# Patient Record
Sex: FEMALE
[De-identification: names, ages, dates, MRNs, and addresses within clinical notes are randomized per-mention and may not be internally consistent; named-entity substitution may affect disease eponyms.]

---

## 2017-08-21 ENCOUNTER — TRANSCRIPTION ENCOUNTER (OUTPATIENT)
Age: 47
End: 2017-08-21

## 2018-08-19 ENCOUNTER — TRANSCRIPTION ENCOUNTER (OUTPATIENT)
Age: 48
End: 2018-08-19

## 2019-02-26 ENCOUNTER — TRANSCRIPTION ENCOUNTER (OUTPATIENT)
Age: 49
End: 2019-02-26

## 2019-04-04 ENCOUNTER — TRANSCRIPTION ENCOUNTER (OUTPATIENT)
Age: 49
End: 2019-04-04

## 2019-11-15 ENCOUNTER — TRANSCRIPTION ENCOUNTER (OUTPATIENT)
Age: 49
End: 2019-11-15

## 2019-12-05 ENCOUNTER — TRANSCRIPTION ENCOUNTER (OUTPATIENT)
Age: 49
End: 2019-12-05

## 2019-12-10 PROBLEM — Z00.00 ENCOUNTER FOR PREVENTIVE HEALTH EXAMINATION: Status: ACTIVE | Noted: 2019-12-10

## 2020-01-08 ENCOUNTER — APPOINTMENT (OUTPATIENT)
Dept: NEUROLOGY | Facility: CLINIC | Age: 50
End: 2020-01-08
Payer: COMMERCIAL

## 2020-01-08 VITALS
HEIGHT: 69.5 IN | HEART RATE: 80 BPM | SYSTOLIC BLOOD PRESSURE: 119 MMHG | OXYGEN SATURATION: 97 % | TEMPERATURE: 98.3 F | DIASTOLIC BLOOD PRESSURE: 80 MMHG | WEIGHT: 148 LBS | BODY MASS INDEX: 21.43 KG/M2

## 2020-01-08 DIAGNOSIS — Z78.9 OTHER SPECIFIED HEALTH STATUS: ICD-10-CM

## 2020-01-08 DIAGNOSIS — G51.0 BELL'S PALSY: ICD-10-CM

## 2020-01-08 DIAGNOSIS — Z82.61 FAMILY HISTORY OF ARTHRITIS: ICD-10-CM

## 2020-01-08 PROCEDURE — 99204 OFFICE O/P NEW MOD 45 MIN: CPT

## 2020-01-08 RX ORDER — ESTRADIOL AND LEVONORGESTREL .045; .015 MG/D; MG/D
PATCH TRANSDERMAL
Refills: 0 | Status: ACTIVE | COMMUNITY

## 2020-01-08 NOTE — ASSESSMENT
[FreeTextEntry1] : 1) 7th nerve palsy, resolving.  Given 7th nerve enhancement on [place] MRI and resolving symptoms, agree this is most likely Bell's palsy, though difficult to distinguish central vs peripheral on exam at this time as forehead raise is symmetric, but symptoms are subtle overall.  Most likely viral but will rule out Lyme.\par -Lyme Western blot\par \par 2) Trouble focusing.  Unrelated, but patient reports that it has been bothering her for many years and she would like to be evaluated.\par -Neuropsych testing\par -RTC PRN

## 2020-01-08 NOTE — HISTORY OF PRESENT ILLNESS
[FreeTextEntry1] : 49-year-old woman who presents following recent ED visit for Bell's palsy.\par \par Saturday after Thanksgiving was driving back to the city from [place], felt lightheaded and weak.  Symptoms continued over the next few days.  On Wednesday she noted that she could not hear well, could not close her lips around a straw, lower face was numb, and face was asymmetric.  \par \par She presented to urgent care and was sent to [facility].  On exam noted to have L facial droop (unclear whether central or peripheral from notes).  CT head negative.  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  She was diagnosed with Bell's palsy and discharged with prednisone 60 mg x 7 days.\par \par Since then her facial weakness has gradually improved -- now able to close eye, though mouth still looks slightly asymmetric.  Hearing is back to normal.  Feels like her vision may be impaired but thinks this is due to eye dryness.\par \par Has never had anything like this before.  No other history of numbness, weakness, tingling or other neurologic symptoms.  Does report that she has trouble focusing, is a procrastinator.

## 2020-01-08 NOTE — PHYSICAL EXAM
[FreeTextEntry1] : Physical Exam\par Constitutional: no apparent distress\par Psychiatric: normal affect, euthyhmic, alert and oriented x 3\par HEENT: moist mucous membranes, oropharynx clear\par Neck: supple, no lymphadenopathy\par Respiratory: clear to auscultation bilaterally, no crackles or wheezing\par Cardiovascular: regular rate and rhythm, normal S1/S2, no murmurs, no edema\par GI: soft, non-tender, no distended, bowel sounds present; no hepatosplenomegaly on palpation\par Musculoskeletal: extremities warm, well-perfused, normal range of motion\par Skin: no rashes, bruises, or lesions\par \par Neurologic Exam:\par Mental Status: awake and alert, oriented to time, place, and person, attention intact, speech fluent and prosodic with no paraphasic errors, repetition intact, follows simple and complex commands, normal fund of knowledge\par Cranial Nerves: I: deferred; II: pupils equal round and reactive, visual fields full to confrontation; III, IV, VI: extraocular movements full with no nystagmus; V: facial sensation intact and symmetric; VII: forehead symmetric, left nasolabial fold flattening; VIII: hearing intact to finger rub; IX/X: palate elevates symmetrically, no dysarthria; XI: shoulder shrug symmetric; XII: tongue protrudes midline\par Motor: normal bulk and tone, no orbiting or pronator drift, power 5/5 to confrontation throughout including shoulder abduction, elbow flexion and extension, wrist flexion and extension, hip flexion, knee flexion and extension, no abnormal movements\par Sensation: intact to light touch in distal upper and lower extremities bilaterally\par Coordination: finger-nose-finger intact bilaterally\par Reflexes: 2+ biceps, triceps, brachioradialis, patella\par Gait: narrow base, normal stance and stride, normal arm swing

## 2020-01-21 DIAGNOSIS — R41.840 ATTENTION AND CONCENTRATION DEFICIT: ICD-10-CM

## 2020-01-21 LAB
B BURGDOR AB SER-IMP: NEGATIVE
B BURGDOR IGM PATRN SER IB-IMP: NEGATIVE
B BURGDOR18KD IGG SER QL IB: NORMAL
B BURGDOR23KD IGG SER QL IB: NORMAL
B BURGDOR23KD IGM SER QL IB: NORMAL
B BURGDOR28KD IGG SER QL IB: NORMAL
B BURGDOR30KD IGG SER QL IB: NORMAL
B BURGDOR31KD IGG SER QL IB: NORMAL
B BURGDOR39KD IGG SER QL IB: NORMAL
B BURGDOR39KD IGM SER QL IB: NORMAL
B BURGDOR41KD IGG SER QL IB: NORMAL
B BURGDOR41KD IGM SER QL IB: NORMAL
B BURGDOR45KD IGG SER QL IB: NORMAL
B BURGDOR58KD IGG SER QL IB: NORMAL
B BURGDOR66KD IGG SER QL IB: NORMAL
B BURGDOR93KD IGG SER QL IB: NORMAL

## 2020-07-17 ENCOUNTER — TRANSCRIPTION ENCOUNTER (OUTPATIENT)
Age: 50
End: 2020-07-17

## 2021-06-21 ENCOUNTER — TRANSCRIPTION ENCOUNTER (OUTPATIENT)
Age: 51
End: 2021-06-21

## 2021-08-20 ENCOUNTER — TRANSCRIPTION ENCOUNTER (OUTPATIENT)
Age: 51
End: 2021-08-20

## 2021-09-10 ENCOUNTER — TRANSCRIPTION ENCOUNTER (OUTPATIENT)
Age: 51
End: 2021-09-10

## 2021-11-07 ENCOUNTER — TRANSCRIPTION ENCOUNTER (OUTPATIENT)
Age: 51
End: 2021-11-07

## 2021-12-22 ENCOUNTER — TRANSCRIPTION ENCOUNTER (OUTPATIENT)
Age: 51
End: 2021-12-22

## 2022-03-29 ENCOUNTER — TRANSCRIPTION ENCOUNTER (OUTPATIENT)
Age: 52
End: 2022-03-29

## 2023-05-01 ENCOUNTER — OFFICE (OUTPATIENT)
Dept: URBAN - METROPOLITAN AREA CLINIC 28 | Facility: CLINIC | Age: 53
Setting detail: OPHTHALMOLOGY
End: 2023-05-01
Payer: COMMERCIAL

## 2023-05-01 DIAGNOSIS — H25.13: ICD-10-CM

## 2023-05-01 DIAGNOSIS — H40.013: ICD-10-CM

## 2023-05-01 DIAGNOSIS — H52.4: ICD-10-CM

## 2023-05-01 PROCEDURE — 92133 CPTRZD OPH DX IMG PST SGM ON: CPT | Performed by: OPHTHALMOLOGY

## 2023-05-01 PROCEDURE — 92015 DETERMINE REFRACTIVE STATE: CPT | Performed by: OPHTHALMOLOGY

## 2023-05-01 PROCEDURE — 92014 COMPRE OPH EXAM EST PT 1/>: CPT | Performed by: OPHTHALMOLOGY

## 2023-05-01 ASSESSMENT — REFRACTION_CURRENTRX
OD_VPRISM_DIRECTION: PROGS
OS_CYLINDER: -1.00
OS_ADD: +2.00
OD_SPHERE: PLANO
OS_CYLINDER: 0.00
OD_AXIS: 70
OD_CYLINDER: -0.50
OD_ADD: +2.00
OS_OVR_VA: 20/
OS_SPHERE: PLANO
OD_CYLINDER: 0.00
OS_ADD: +2.25
OD_OVR_VA: 20/
OD_AXIS: 180
OD_OVR_VA: 20/
OD_ADD: +2.25
OS_OVR_VA: 20/
OS_SPHERE: PLANO
OS_AXIS: 180
OS_VPRISM_DIRECTION: PROGS
OD_SPHERE: PLANO
OS_AXIS: 104

## 2023-05-01 ASSESSMENT — REFRACTION_MANIFEST
OS_ADD: +2.50
OD_AXIS: 70
OD_SPHERE: +1.25
OD_ADD: +2.50
OD_VA2: 20/20(J1+)
OS_AXIS: 104
OD_CYLINDER: -0.50
OD_AXIS: 090
OS_CYLINDER: -1.00
OD_VA1: 20/25+2
OS_SPHERE: +1.25
OS_SPHERE: PLANO
OS_SPHERE: PL
OD_SPHERE: PL
OS_ADD: +2.25
OD_ADD: +2.25
OS_VA2: 20/20(J1+)
OD_SPHERE: PLANO
OS_AXIS: 100
OD_ADD: +3.00
OS_ADD: +3.00
OD_CYLINDER: -1.00
OS_VA1: 20/20-2
OS_CYLINDER: -1.00

## 2023-05-01 ASSESSMENT — REFRACTION_AUTOREFRACTION
OD_CYLINDER: -1.25
OD_SPHERE: +1.50
OD_AXIS: 091
OS_CYLINDER: -1.25
OS_SPHERE: +1.50
OS_AXIS: 099

## 2023-05-01 ASSESSMENT — AXIALLENGTH_DERIVED
OS_AL: 24.3783
OS_AL: 24.4304
OD_AL: 24.2836
OD_AL: 24.2321

## 2023-05-01 ASSESSMENT — KERATOMETRY
OS_AXISANGLE_DEGREES: 009
OD_AXISANGLE_DEGREES: 003
OD_K2POWER_DIOPTERS: 41.25
OS_K1POWER_DIOPTERS: 40.00
OD_K1POWER_DIOPTERS: 40.50
OS_K2POWER_DIOPTERS: 41.00

## 2023-05-01 ASSESSMENT — SUPERFICIAL PUNCTATE KERATITIS (SPK)
OD_SPK: T
OS_SPK: T

## 2023-05-01 ASSESSMENT — SPHEQUIV_DERIVED
OD_SPHEQUIV: 0.875
OD_SPHEQUIV: 0.75
OS_SPHEQUIV: 0.875
OS_SPHEQUIV: 0.75

## 2023-05-01 ASSESSMENT — PACHYMETRY
OD_CT_UM: 551
OS_CT_UM: 549
OD_CT_CORRECTION: -1
OS_CT_CORRECTION: 0

## 2023-05-01 ASSESSMENT — VISUAL ACUITY
OD_BCVA: 20/25
OS_BCVA: 20/25

## 2023-05-01 ASSESSMENT — TONOMETRY
OS_IOP_MMHG: 17
OD_IOP_MMHG: 18

## 2023-05-01 ASSESSMENT — CONFRONTATIONAL VISUAL FIELD TEST (CVF)
OS_FINDINGS: FULL
OD_FINDINGS: FULL

## 2023-06-03 ENCOUNTER — NON-APPOINTMENT (OUTPATIENT)
Age: 53
End: 2023-06-03

## 2023-06-05 ENCOUNTER — OFFICE (OUTPATIENT)
Dept: URBAN - METROPOLITAN AREA CLINIC 28 | Facility: CLINIC | Age: 53
Setting detail: OPHTHALMOLOGY
End: 2023-06-05
Payer: COMMERCIAL

## 2023-06-05 DIAGNOSIS — H25.13: ICD-10-CM

## 2023-06-05 DIAGNOSIS — H40.013: ICD-10-CM

## 2023-06-05 PROCEDURE — 92083 EXTENDED VISUAL FIELD XM: CPT | Performed by: OPHTHALMOLOGY

## 2023-06-05 PROCEDURE — 92012 INTRM OPH EXAM EST PATIENT: CPT | Performed by: OPHTHALMOLOGY

## 2023-06-05 ASSESSMENT — REFRACTION_CURRENTRX
OS_AXIS: 104
OS_SPHERE: PLANO
OD_SPHERE: PLANO
OS_SPHERE: PLANO
OD_AXIS: 70
OD_OVR_VA: 20/
OD_ADD: +2.25
OD_SPHERE: PLANO
OS_CYLINDER: -1.00
OD_CYLINDER: 0.00
OS_OVR_VA: 20/
OD_AXIS: 180
OS_OVR_VA: 20/
OD_OVR_VA: 20/
OD_CYLINDER: -0.50
OS_ADD: +2.00
OD_ADD: +2.00
OS_VPRISM_DIRECTION: PROGS
OD_VPRISM_DIRECTION: PROGS
OS_CYLINDER: 0.00
OS_AXIS: 180
OS_ADD: +2.25

## 2023-06-05 ASSESSMENT — REFRACTION_MANIFEST
OS_CYLINDER: -1.00
OD_SPHERE: +1.25
OD_SPHERE: PL
OD_AXIS: 090
OD_AXIS: 70
OD_ADD: +3.00
OS_ADD: +2.25
OD_CYLINDER: -1.00
OS_VA2: 20/20(J1+)
OD_CYLINDER: -0.50
OD_VA2: 20/20(J1+)
OS_AXIS: 100
OS_ADD: +2.50
OS_VA1: 20/20-2
OS_SPHERE: PLANO
OD_ADD: +2.50
OD_VA1: 20/25+2
OD_ADD: +2.25
OS_CYLINDER: -1.00
OS_SPHERE: +1.25
OS_SPHERE: PL
OS_AXIS: 104
OS_ADD: +3.00
OD_SPHERE: PLANO

## 2023-06-05 ASSESSMENT — TONOMETRY
OD_IOP_MMHG: 17
OS_IOP_MMHG: 16

## 2023-06-05 ASSESSMENT — SUPERFICIAL PUNCTATE KERATITIS (SPK)
OS_SPK: T
OD_SPK: T

## 2023-06-05 ASSESSMENT — KERATOMETRY
OD_K2POWER_DIOPTERS: 41.25
OS_K2POWER_DIOPTERS: 41.00
OS_AXISANGLE_DEGREES: 012
OD_AXISANGLE_DEGREES: 004
OD_K1POWER_DIOPTERS: 40.25
OS_K1POWER_DIOPTERS: 40.25

## 2023-06-05 ASSESSMENT — SPHEQUIV_DERIVED
OD_SPHEQUIV: 0.75
OS_SPHEQUIV: 0.875
OD_SPHEQUIV: 0.625
OS_SPHEQUIV: 0.75

## 2023-06-05 ASSESSMENT — PACHYMETRY
OD_CT_CORRECTION: -1
OS_CT_CORRECTION: 0
OD_CT_UM: 551
OS_CT_UM: 549

## 2023-06-05 ASSESSMENT — AXIALLENGTH_DERIVED
OD_AL: 24.3323
OD_AL: 24.3842
OS_AL: 24.3294
OS_AL: 24.3813

## 2023-06-05 ASSESSMENT — REFRACTION_AUTOREFRACTION
OD_CYLINDER: -1.25
OD_AXIS: 090
OS_CYLINDER: -1.25
OS_AXIS: 101
OS_SPHERE: +1.50
OD_SPHERE: +1.25

## 2023-06-05 ASSESSMENT — VISUAL ACUITY
OD_BCVA: 20/25
OS_BCVA: 20/25

## 2023-06-05 ASSESSMENT — CONFRONTATIONAL VISUAL FIELD TEST (CVF)
OS_FINDINGS: FULL
OD_FINDINGS: FULL

## 2023-12-27 ENCOUNTER — NON-APPOINTMENT (OUTPATIENT)
Age: 53
End: 2023-12-27

## 2024-03-23 ENCOUNTER — NON-APPOINTMENT (OUTPATIENT)
Age: 54
End: 2024-03-23

## 2024-08-28 ENCOUNTER — OFFICE (OUTPATIENT)
Dept: URBAN - METROPOLITAN AREA CLINIC 97 | Facility: CLINIC | Age: 54
Setting detail: OPHTHALMOLOGY
End: 2024-08-28
Payer: COMMERCIAL

## 2024-08-28 ENCOUNTER — RX ONLY (RX ONLY)
Age: 54
End: 2024-08-28

## 2024-08-28 DIAGNOSIS — H16.223: ICD-10-CM

## 2024-08-28 DIAGNOSIS — H52.4: ICD-10-CM

## 2024-08-28 DIAGNOSIS — H25.13: ICD-10-CM

## 2024-08-28 DIAGNOSIS — H40.013: ICD-10-CM

## 2024-08-28 PROBLEM — H10.45 ALLERGIC CONJUNCTIVITIS: Status: ACTIVE | Noted: 2024-08-28

## 2024-08-28 PROCEDURE — 92133 CPTRZD OPH DX IMG PST SGM ON: CPT | Performed by: OPTOMETRIST

## 2024-08-28 PROCEDURE — 92014 COMPRE OPH EXAM EST PT 1/>: CPT | Performed by: OPTOMETRIST

## 2024-08-28 PROCEDURE — 92015 DETERMINE REFRACTIVE STATE: CPT | Performed by: OPTOMETRIST

## 2024-08-28 ASSESSMENT — CONFRONTATIONAL VISUAL FIELD TEST (CVF)
OD_FINDINGS: FULL
OS_FINDINGS: FULL

## 2024-11-22 ENCOUNTER — APPOINTMENT (OUTPATIENT)
Dept: NEUROLOGY | Facility: CLINIC | Age: 54
End: 2024-11-22

## 2024-11-22 VITALS
HEART RATE: 74 BPM | HEIGHT: 69 IN | OXYGEN SATURATION: 98 % | SYSTOLIC BLOOD PRESSURE: 121 MMHG | DIASTOLIC BLOOD PRESSURE: 80 MMHG | BODY MASS INDEX: 21.95 KG/M2 | TEMPERATURE: 98.4 F | WEIGHT: 148.2 LBS

## 2024-11-22 DIAGNOSIS — R41.840 ATTENTION AND CONCENTRATION DEFICIT: ICD-10-CM

## 2024-11-22 PROCEDURE — 99204 OFFICE O/P NEW MOD 45 MIN: CPT

## 2024-11-22 RX ORDER — ATOMOXETINE 18 MG/1
18 CAPSULE ORAL
Qty: 60 | Refills: 1 | Status: ACTIVE | COMMUNITY
Start: 2024-11-22 | End: 1900-01-01

## 2025-01-20 ENCOUNTER — OFFICE (OUTPATIENT)
Dept: URBAN - METROPOLITAN AREA CLINIC 97 | Facility: CLINIC | Age: 55
Setting detail: OPHTHALMOLOGY
End: 2025-01-20
Payer: COMMERCIAL

## 2025-01-20 DIAGNOSIS — H25.13: ICD-10-CM

## 2025-01-20 DIAGNOSIS — H16.223: ICD-10-CM

## 2025-01-20 DIAGNOSIS — H40.013: ICD-10-CM

## 2025-01-20 PROCEDURE — 92014 COMPRE OPH EXAM EST PT 1/>: CPT | Performed by: OPTOMETRIST

## 2025-01-20 PROCEDURE — 92083 EXTENDED VISUAL FIELD XM: CPT | Performed by: OPTOMETRIST

## 2025-01-20 ASSESSMENT — PACHYMETRY
OD_CT_CORRECTION: -1
OD_CT_UM: 551
OS_CT_UM: 549
OS_CT_CORRECTION: 0

## 2025-01-20 ASSESSMENT — TONOMETRY
OS_IOP_MMHG: 18
OD_IOP_MMHG: 19

## 2025-01-20 ASSESSMENT — SUPERFICIAL PUNCTATE KERATITIS (SPK)
OD_SPK: T
OS_SPK: T

## 2025-01-20 ASSESSMENT — CONFRONTATIONAL VISUAL FIELD TEST (CVF)
OD_FINDINGS: FULL
OS_FINDINGS: FULL

## 2025-01-21 PROBLEM — Z46.0 ENCOUNTER FOR FITTING AND ADJUSTMENT OF SPECTACLES AND CONTACT LENSES ; BOTH EYES: Status: ACTIVE | Noted: 2025-01-20

## 2025-01-21 ASSESSMENT — REFRACTION_CURRENTRX
OS_ADD: +2.25
OD_OVR_VA: 20/
OS_VPRISM_DIRECTION: PROGS
OS_OVR_VA: 20/
OD_AXIS: 085
OS_ADD: +2.25
OD_VPRISM_DIRECTION: PROGS
OD_ADD: +2.25
OD_AXIS: 180
OD_ADD: +2.25
OS_SPHERE: +1.75
OD_SPHERE: +1.50
OS_OVR_VA: 20/
OS_AXIS: 095
OS_SPHERE: PLANO
OD_OVR_VA: 20/
OD_VPRISM_DIRECTION: PROGS
OS_CYLINDER: -1.25
OS_AXIS: 180
OD_CYLINDER: -1.25
OS_CYLINDER: 0.00
OS_VPRISM_DIRECTION: PROGS
OD_SPHERE: PLANO
OD_CYLINDER: 0.00

## 2025-01-21 ASSESSMENT — REFRACTION_MANIFEST
OD_ADD: +2.25
OS_AXIS: 005
OS_SPHERE: PL
OD_CYLINDER: -0.50
OD_VA1: 20/20
OS_ADD: +2.25
OD_SPHERE: +0.25
OS_AXIS: 104
OD_VA2: 20/20(J1+)
OS_ADD: +2.25
OD_VA1: 20/20
OS_CYLINDER: -1.00
OS_CYLINDER: +0.75
OD_ADD: +2.25
OD_SPHERE: PL
OS_VA1: 20/20
OS_VA1: 20/20
OS_VA2: 20/20(J1+)
OD_VA2: 20/20(J1+)
OD_CYLINDER: +0.75
OS_VA2: 20/20(J1+)
OD_AXIS: 175
OS_ADD: +2.50
OD_AXIS: 175
OD_CYLINDER: +1.25
OS_SPHERE: +0.25
OS_CYLINDER: +1.25
OD_SPHERE: +0.25
OS_SPHERE: +0.50
OD_ADD: +2.50
OS_AXIS: 005
OD_AXIS: 70

## 2025-01-21 ASSESSMENT — KERATOMETRY
OS_AXISANGLE_DEGREES: 012
OD_K2POWER_DIOPTERS: 41.25
OD_AXISANGLE_DEGREES: 001
OS_K1POWER_DIOPTERS: 40.00
OD_K1POWER_DIOPTERS: 40.50
OS_K2POWER_DIOPTERS: 41.00
METHOD_AUTO_MANUAL: AUTO

## 2025-01-21 ASSESSMENT — VISUAL ACUITY
OS_BCVA: 20/25
OD_BCVA: 20/25-

## 2025-01-21 ASSESSMENT — REFRACTION_AUTOREFRACTION
OD_CYLINDER: +1.50
OS_AXIS: 008
OS_SPHERE: +0.50
OD_AXIS: 176
OD_SPHERE: +0.25
OS_CYLINDER: +1.25

## 2025-02-06 ENCOUNTER — NON-APPOINTMENT (OUTPATIENT)
Age: 55
End: 2025-02-06

## 2025-02-06 ENCOUNTER — APPOINTMENT (OUTPATIENT)
Dept: NEUROLOGY | Facility: CLINIC | Age: 55
End: 2025-02-06
Payer: COMMERCIAL

## 2025-02-06 VITALS
OXYGEN SATURATION: 100 % | SYSTOLIC BLOOD PRESSURE: 119 MMHG | TEMPERATURE: 98.5 F | HEIGHT: 69 IN | DIASTOLIC BLOOD PRESSURE: 78 MMHG | HEART RATE: 93 BPM | BODY MASS INDEX: 22.36 KG/M2 | WEIGHT: 151 LBS

## 2025-02-06 DIAGNOSIS — R41.840 ATTENTION AND CONCENTRATION DEFICIT: ICD-10-CM

## 2025-02-06 PROCEDURE — 99214 OFFICE O/P EST MOD 30 MIN: CPT

## 2025-02-06 RX ORDER — METHYLPHENIDATE HYDROCHLORIDE 5 MG/1
5 TABLET ORAL DAILY
Qty: 30 | Refills: 0 | Status: ACTIVE | COMMUNITY
Start: 2025-02-06 | End: 1900-01-01

## 2025-02-10 ENCOUNTER — NON-APPOINTMENT (OUTPATIENT)
Age: 55
End: 2025-02-10

## 2025-02-10 ASSESSMENT — REFRACTION_CURRENTRX
OD_ADD: +2.25
OS_AXIS: 180
OD_SPHERE: PLANO
OS_VPRISM_DIRECTION: PROGS
OD_ADD: +2.25
OS_SPHERE: +1.75
OD_CYLINDER: -1.25
OS_VPRISM_DIRECTION: PROGS
OD_OVR_VA: 20/
OS_OVR_VA: 20/
OD_AXIS: 180
OS_ADD: +2.25
OS_CYLINDER: 0.00
OS_AXIS: 095
OS_ADD: +2.25
OD_OVR_VA: 20/
OD_VPRISM_DIRECTION: PROGS
OS_CYLINDER: -1.25
OS_SPHERE: PLANO
OS_OVR_VA: 20/
OD_SPHERE: +1.50
OD_VPRISM_DIRECTION: PROGS
OD_CYLINDER: 0.00
OD_AXIS: 085

## 2025-02-10 ASSESSMENT — REFRACTION_MANIFEST
OS_ADD: +2.25
OD_ADD: +2.50
OS_VA1: 20/20
OD_AXIS: 175
OD_AXIS: 175
OS_VA2: 20/20(J1+)
OS_SPHERE: +0.25
OD_ADD: +2.25
OS_SPHERE: PL
OS_AXIS: 005
OD_VA1: 20/20
OD_CYLINDER: +0.75
OD_VA2: 20/20(J1+)
OD_VA2: 20/20(J1+)
OS_AXIS: 104
OD_CYLINDER: -0.50
OS_SPHERE: +0.50
OD_SPHERE: +0.25
OD_SPHERE: PL
OD_AXIS: 70
OS_AXIS: 005
OD_VA1: 20/20
OS_CYLINDER: +0.75
OD_CYLINDER: +1.25
OD_SPHERE: +0.25
OD_ADD: +2.25
OS_VA1: 20/20
OS_VA2: 20/20(J1+)
OS_ADD: +2.50
OS_ADD: +2.25
OS_CYLINDER: +1.25
OS_CYLINDER: -1.00

## 2025-02-10 ASSESSMENT — KERATOMETRY
OD_K2POWER_DIOPTERS: 41.25
OD_AXISANGLE_DEGREES: 001
OD_K1POWER_DIOPTERS: 40.50
OS_AXISANGLE_DEGREES: 012
OS_K2POWER_DIOPTERS: 41.00
OS_K1POWER_DIOPTERS: 40.00
METHOD_AUTO_MANUAL: AUTO

## 2025-02-12 ENCOUNTER — APPOINTMENT (OUTPATIENT)
Dept: HEART AND VASCULAR | Facility: CLINIC | Age: 55
End: 2025-02-12
Payer: COMMERCIAL

## 2025-02-12 VITALS
OXYGEN SATURATION: 99 % | TEMPERATURE: 97.5 F | WEIGHT: 150 LBS | SYSTOLIC BLOOD PRESSURE: 122 MMHG | HEART RATE: 75 BPM | HEIGHT: 69 IN | BODY MASS INDEX: 22.22 KG/M2 | DIASTOLIC BLOOD PRESSURE: 78 MMHG

## 2025-02-12 DIAGNOSIS — R00.2 PALPITATIONS: ICD-10-CM

## 2025-02-12 PROCEDURE — G2211 COMPLEX E/M VISIT ADD ON: CPT | Mod: NC

## 2025-02-12 PROCEDURE — 99204 OFFICE O/P NEW MOD 45 MIN: CPT

## 2025-02-12 RX ORDER — PROPRANOLOL HYDROCHLORIDE 10 MG/1
10 TABLET ORAL
Qty: 60 | Refills: 3 | Status: ACTIVE | COMMUNITY
Start: 2025-02-12 | End: 1900-01-01

## 2025-02-13 ENCOUNTER — APPOINTMENT (OUTPATIENT)
Dept: HEART AND VASCULAR | Facility: CLINIC | Age: 55
End: 2025-02-13
Payer: COMMERCIAL

## 2025-02-13 PROCEDURE — 93306 TTE W/DOPPLER COMPLETE: CPT

## 2025-05-12 ENCOUNTER — OFFICE (OUTPATIENT)
Dept: URBAN - METROPOLITAN AREA CLINIC 97 | Facility: CLINIC | Age: 55
Setting detail: OPHTHALMOLOGY
End: 2025-05-12
Payer: COMMERCIAL

## 2025-05-12 DIAGNOSIS — Z46.0: ICD-10-CM

## 2025-05-12 PROCEDURE — CLNEW CL LENS FIT FIRST TIME WEARER FITTING FEE ONLY: Performed by: OPTOMETRIST

## 2025-05-12 ASSESSMENT — REFRACTION_MANIFEST
OS_SPHERE: +0.25
OD_ADD: +2.25
OS_SPHERE: PL
OD_ADD: +2.50
OD_VA1: 20/20
OD_CYLINDER: -0.50
OD_SPHERE: +0.25
OS_ADD: +2.50
OD_SPHERE: PL
OS_CYLINDER: +1.25
OD_AXIS: 70
OS_VA2: 20/20(J1+)
OD_CYLINDER: +1.25
OS_ADD: +2.25
OD_AXIS: 175
OD_VA2: 20/20(J1+)
OD_VA2: 20/20(J1+)
OS_CYLINDER: -1.00
OS_AXIS: 104
OS_VA1: 20/20
OD_CYLINDER: +0.75
OS_AXIS: 005
OD_ADD: +2.25
OS_VA2: 20/20(J1+)
OS_AXIS: 005
OD_AXIS: 175
OS_VA1: 20/20
OS_ADD: +2.25
OD_SPHERE: +0.25
OS_SPHERE: +0.50
OS_CYLINDER: +0.75
OD_VA1: 20/20

## 2025-05-12 ASSESSMENT — REFRACTION_CURRENTRX
OD_VPRISM_DIRECTION: PROGS
OD_OVR_VA: 20/
OS_SPHERE: +1.75
OD_AXIS: 180
OS_VPRISM_DIRECTION: PROGS
OS_AXIS: 095
OS_ADD: +2.25
OD_AXIS: 085
OD_CYLINDER: -1.25
OS_SPHERE: PLANO
OS_OVR_VA: 20/
OD_ADD: +2.25
OS_CYLINDER: 0.00
OD_ADD: +2.25
OS_ADD: +2.25
OD_CYLINDER: 0.00
OS_CYLINDER: -1.25
OD_OVR_VA: 20/
OD_SPHERE: PLANO
OS_VPRISM_DIRECTION: PROGS
OS_AXIS: 180
OD_VPRISM_DIRECTION: PROGS
OD_SPHERE: +1.50
OS_OVR_VA: 20/

## 2025-05-12 ASSESSMENT — REFRACTION_AUTOREFRACTION
OS_AXIS: 009
OS_CYLINDER: +1.00
OD_AXIS: 176
OS_SPHERE: +0.50
OD_SPHERE: +0.25
OD_CYLINDER: +1.25

## 2025-05-12 ASSESSMENT — VISUAL ACUITY
OS_BCVA: 20/25
OD_BCVA: 20/25

## 2025-05-12 ASSESSMENT — KERATOMETRY
OS_K1POWER_DIOPTERS: 40.00
OD_K2POWER_DIOPTERS: 41.00
METHOD_AUTO_MANUAL: AUTO
OD_AXISANGLE_DEGREES: 003
OS_AXISANGLE_DEGREES: 011
OD_K1POWER_DIOPTERS: 40.25
OS_K2POWER_DIOPTERS: 40.75

## 2025-05-12 ASSESSMENT — CONFRONTATIONAL VISUAL FIELD TEST (CVF)
OD_FINDINGS: FULL
OS_FINDINGS: FULL

## 2025-05-14 ENCOUNTER — APPOINTMENT (OUTPATIENT)
Dept: HEART AND VASCULAR | Facility: CLINIC | Age: 55
End: 2025-05-14
Payer: COMMERCIAL

## 2025-05-14 VITALS
WEIGHT: 155 LBS | OXYGEN SATURATION: 99 % | BODY MASS INDEX: 22.96 KG/M2 | HEART RATE: 87 BPM | SYSTOLIC BLOOD PRESSURE: 106 MMHG | DIASTOLIC BLOOD PRESSURE: 66 MMHG | HEIGHT: 69 IN

## 2025-05-14 DIAGNOSIS — I49.3 VENTRICULAR PREMATURE DEPOLARIZATION: ICD-10-CM

## 2025-05-14 PROCEDURE — 99214 OFFICE O/P EST MOD 30 MIN: CPT

## 2025-05-14 PROCEDURE — G2211 COMPLEX E/M VISIT ADD ON: CPT | Mod: NC

## 2025-05-16 LAB
ANION GAP SERPL CALC-SCNC: 13 MMOL/L
BUN SERPL-MCNC: 16 MG/DL
CALCIUM SERPL-MCNC: 9.5 MG/DL
CHLORIDE SERPL-SCNC: 103 MMOL/L
CO2 SERPL-SCNC: 24 MMOL/L
CREAT SERPL-MCNC: 0.71 MG/DL
EGFRCR SERPLBLD CKD-EPI 2021: 99 ML/MIN/1.73M2
GLUCOSE SERPL-MCNC: 88 MG/DL
POTASSIUM SERPL-SCNC: 4.1 MMOL/L
SODIUM SERPL-SCNC: 140 MMOL/L

## 2025-05-26 ENCOUNTER — TRANSCRIPTION ENCOUNTER (OUTPATIENT)
Age: 55
End: 2025-05-26

## 2025-06-07 ENCOUNTER — TRANSCRIPTION ENCOUNTER (OUTPATIENT)
Age: 55
End: 2025-06-07

## 2025-06-17 ENCOUNTER — TRANSCRIPTION ENCOUNTER (OUTPATIENT)
Age: 55
End: 2025-06-17

## 2025-07-01 ENCOUNTER — OFFICE (OUTPATIENT)
Dept: URBAN - METROPOLITAN AREA CLINIC 97 | Facility: CLINIC | Age: 55
Setting detail: OPHTHALMOLOGY
End: 2025-07-01
Payer: COMMERCIAL

## 2025-07-01 DIAGNOSIS — Z46.0: ICD-10-CM

## 2025-07-01 PROCEDURE — N/C NO CHARGE: Performed by: OPTOMETRIST

## 2025-07-01 ASSESSMENT — REFRACTION_CURRENTRX
OD_ADD: +2.25
OS_SPHERE: PLANO
OS_VPRISM_DIRECTION: PROGS
OD_CYLINDER: 0.00
OD_AXIS: 180
OS_CYLINDER: 0.00
OS_AXIS: 095
OS_AXIS: 180
OS_OVR_VA: 20/
OD_VPRISM_DIRECTION: PROGS
OS_VPRISM_DIRECTION: PROGS
OD_SPHERE: PLANO
OD_OVR_VA: 20/
OD_OVR_VA: 20/
OD_AXIS: 085
OS_ADD: +2.25
OD_ADD: +2.25
OS_ADD: +2.25
OS_CYLINDER: -1.25
OD_VPRISM_DIRECTION: PROGS
OD_CYLINDER: -1.25
OS_OVR_VA: 20/
OD_SPHERE: +1.50
OS_SPHERE: +1.75

## 2025-07-01 ASSESSMENT — REFRACTION_MANIFEST
OS_SPHERE: +0.25
OS_ADD: +2.25
OS_SPHERE: +0.50
OD_CYLINDER: -0.50
OD_VA2: 20/20(J1+)
OD_VA1: 20/20
OD_AXIS: 175
OD_AXIS: 175
OS_ADD: +2.50
OD_ADD: +2.25
OS_VA1: 20/20
OS_VA1: 20/20
OS_AXIS: 005
OS_SPHERE: PL
OS_VA2: 20/20(J1+)
OS_VA2: 20/20(J1+)
OD_CYLINDER: +0.75
OS_AXIS: 005
OD_ADD: +2.50
OD_SPHERE: PL
OD_CYLINDER: +1.25
OD_SPHERE: +0.25
OD_AXIS: 70
OD_ADD: +2.25
OS_CYLINDER: +0.75
OS_ADD: +2.25
OS_CYLINDER: +1.25
OD_VA1: 20/20
OD_VA2: 20/20(J1+)
OD_SPHERE: +0.25
OS_AXIS: 104
OS_CYLINDER: -1.00

## 2025-07-01 ASSESSMENT — KERATOMETRY
OS_AXISANGLE_DEGREES: 011
OD_K2POWER_DIOPTERS: 41.00
OD_AXISANGLE_DEGREES: 003
OS_K1POWER_DIOPTERS: 40.00
OD_K1POWER_DIOPTERS: 40.25
METHOD_AUTO_MANUAL: AUTO
OS_K2POWER_DIOPTERS: 40.75

## 2025-07-01 ASSESSMENT — REFRACTION_AUTOREFRACTION
OD_AXIS: 176
OS_SPHERE: +0.50
OD_SPHERE: +0.25
OD_CYLINDER: +1.25
OS_AXIS: 009
OS_CYLINDER: +1.00

## 2025-07-01 ASSESSMENT — CONFRONTATIONAL VISUAL FIELD TEST (CVF)
OS_FINDINGS: FULL
OD_FINDINGS: FULL

## 2025-07-01 ASSESSMENT — VISUAL ACUITY
OS_BCVA: 20/25
OD_BCVA: 20/25

## 2025-07-07 ENCOUNTER — APPOINTMENT (OUTPATIENT)
Dept: NEUROLOGY | Facility: CLINIC | Age: 55
End: 2025-07-07

## 2025-07-07 PROCEDURE — 90791 PSYCH DIAGNOSTIC EVALUATION: CPT

## 2025-07-21 ENCOUNTER — OFFICE (OUTPATIENT)
Dept: URBAN - METROPOLITAN AREA CLINIC 97 | Facility: CLINIC | Age: 55
Setting detail: OPHTHALMOLOGY
End: 2025-07-21
Payer: COMMERCIAL

## 2025-07-21 DIAGNOSIS — H16.223: ICD-10-CM

## 2025-07-21 DIAGNOSIS — H40.013: ICD-10-CM

## 2025-07-21 DIAGNOSIS — H25.13: ICD-10-CM

## 2025-07-21 PROCEDURE — 92014 COMPRE OPH EXAM EST PT 1/>: CPT | Performed by: OPTOMETRIST

## 2025-07-21 ASSESSMENT — REFRACTION_AUTOREFRACTION
OD_AXIS: 175
OS_CYLINDER: +1.25
OS_AXIS: 008
OD_CYLINDER: +1.25
OS_SPHERE: +0.50
OD_SPHERE: +0.50

## 2025-07-21 ASSESSMENT — REFRACTION_MANIFEST
OS_ADD: +2.50
OD_CYLINDER: -0.50
OS_ADD: +2.25
OS_AXIS: 104
OD_VA2: 20/20(J1+)
OD_CYLINDER: +1.25
OS_CYLINDER: +0.75
OS_CYLINDER: -1.00
OS_AXIS: 005
OD_VA1: 20/20
OD_SPHERE: +0.25
OS_VA2: 20/20(J1+)
OD_AXIS: 175
OD_VA1: 20/20
OS_SPHERE: +0.50
OS_VA2: 20/20(J1+)
OS_VA1: 20/20
OS_AXIS: 005
OS_CYLINDER: +1.25
OS_VA1: 20/20
OD_ADD: +2.50
OD_SPHERE: PL
OD_ADD: +2.25
OS_ADD: +2.25
OD_AXIS: 175
OD_CYLINDER: +0.75
OD_AXIS: 70
OD_ADD: +2.25
OS_SPHERE: PL
OD_SPHERE: +0.25
OD_VA2: 20/20(J1+)
OS_SPHERE: +0.25

## 2025-07-21 ASSESSMENT — REFRACTION_CURRENTRX
OD_CYLINDER: +0.75
OS_SPHERE: +1.75
OS_AXIS: 095
OS_VPRISM_DIRECTION: PROGS
OS_SPHERE: +0.25
OS_OVR_VA: 20/
OS_OVR_VA: 20/
OS_CYLINDER: -1.25
OS_CYLINDER: +0.75
OD_VPRISM_DIRECTION: PROGS
OD_AXIS: 175
OS_AXIS: 180
OS_AXIS: 005
OD_ADD: +2.25
OD_ADD: +2.25
OD_VPRISM_DIRECTION: PROGS
OS_VPRISM_DIRECTION: PROGS
OD_SPHERE: PLANO
OS_ADD: +2.25
OD_VPRISM_DIRECTION: PROGS
OS_ADD: +2.25
OD_AXIS: 085
OS_VPRISM_DIRECTION: PROGS
OS_SPHERE: PLANO
OD_CYLINDER: -1.25
OD_SPHERE: +1.50
OD_CYLINDER: 0.00
OD_SPHERE: +0.25
OD_OVR_VA: 20/
OD_AXIS: 180
OD_ADD: +2.25
OS_CYLINDER: 0.00
OD_OVR_VA: 20/
OD_OVR_VA: 20/
OS_ADD: +2.25
OS_OVR_VA: 20/

## 2025-07-21 ASSESSMENT — KERATOMETRY
OD_K1POWER_DIOPTERS: 40.25
OD_K2POWER_DIOPTERS: 41.25
METHOD_AUTO_MANUAL: AUTO
OS_AXISANGLE_DEGREES: 014
OD_AXISANGLE_DEGREES: 002
OS_K2POWER_DIOPTERS: 41.00
OS_K1POWER_DIOPTERS: 40.00

## 2025-07-21 ASSESSMENT — CONFRONTATIONAL VISUAL FIELD TEST (CVF)
OS_FINDINGS: FULL
OD_FINDINGS: FULL

## 2025-07-21 ASSESSMENT — SUPERFICIAL PUNCTATE KERATITIS (SPK)
OD_SPK: T
OS_SPK: T

## 2025-07-21 ASSESSMENT — VISUAL ACUITY
OS_BCVA: 20/25
OD_BCVA: 20/25

## 2025-07-23 ENCOUNTER — TRANSCRIPTION ENCOUNTER (OUTPATIENT)
Age: 55
End: 2025-07-23

## 2025-07-23 ENCOUNTER — APPOINTMENT (OUTPATIENT)
Dept: HEART AND VASCULAR | Facility: CLINIC | Age: 55
End: 2025-07-23
Payer: COMMERCIAL

## 2025-07-23 ENCOUNTER — APPOINTMENT (OUTPATIENT)
Dept: CT IMAGING | Facility: CLINIC | Age: 55
End: 2025-07-23
Payer: COMMERCIAL

## 2025-07-23 ENCOUNTER — OUTPATIENT (OUTPATIENT)
Dept: OUTPATIENT SERVICES | Facility: HOSPITAL | Age: 55
LOS: 1 days | End: 2025-07-23

## 2025-07-23 PROCEDURE — 75574 CT ANGIO HRT W/3D IMAGE: CPT | Mod: 26

## 2025-07-23 PROCEDURE — 93015 CV STRESS TEST SUPVJ I&R: CPT

## 2025-08-08 ENCOUNTER — OFFICE (OUTPATIENT)
Dept: URBAN - METROPOLITAN AREA CLINIC 38 | Facility: CLINIC | Age: 55
Setting detail: OPHTHALMOLOGY
End: 2025-08-08
Payer: COMMERCIAL

## 2025-08-08 DIAGNOSIS — H40.013: ICD-10-CM

## 2025-08-08 PROCEDURE — 92133 CPTRZD OPH DX IMG PST SGM ON: CPT | Performed by: STUDENT IN AN ORGANIZED HEALTH CARE EDUCATION/TRAINING PROGRAM

## 2025-08-08 PROCEDURE — 99213 OFFICE O/P EST LOW 20 MIN: CPT | Performed by: STUDENT IN AN ORGANIZED HEALTH CARE EDUCATION/TRAINING PROGRAM

## 2025-08-08 PROCEDURE — 92083 EXTENDED VISUAL FIELD XM: CPT | Performed by: STUDENT IN AN ORGANIZED HEALTH CARE EDUCATION/TRAINING PROGRAM

## 2025-08-08 ASSESSMENT — REFRACTION_MANIFEST
OS_VA1: 20/20
OD_SPHERE: PL
OD_VA1: 20/20
OS_AXIS: 104
OD_AXIS: 70
OD_VA2: 20/20(J1+)
OS_AXIS: 005
OD_CYLINDER: +1.25
OS_CYLINDER: -1.00
OD_SPHERE: +0.25
OD_ADD: +2.50
OD_VA2: 20/20(J1+)
OS_CYLINDER: +1.25
OD_CYLINDER: -0.50
OD_AXIS: 175
OD_ADD: +2.25
OD_VA1: 20/20
OS_SPHERE: +0.50
OS_CYLINDER: +0.75
OS_VA2: 20/20(J1+)
OS_SPHERE: +0.25
OD_CYLINDER: +0.75
OS_SPHERE: PL
OS_ADD: +2.25
OD_AXIS: 175
OS_ADD: +2.50
OS_VA1: 20/20
OD_ADD: +2.25
OS_AXIS: 005
OS_VA2: 20/20(J1+)
OS_ADD: +2.25
OD_SPHERE: +0.25

## 2025-08-08 ASSESSMENT — REFRACTION_CURRENTRX
OS_VPRISM_DIRECTION: PROGS
OD_SPHERE: +0.25
OS_AXIS: 005
OS_OVR_VA: 20/
OS_ADD: +2.25
OD_CYLINDER: +1.25
OD_CYLINDER: 0.00
OD_ADD: +2.25
OD_AXIS: 180
OS_ADD: +2.25
OD_OVR_VA: 20/
OD_ADD: +2.25
OS_SPHERE: +0.50
OD_VPRISM_DIRECTION: PROGS
OS_OVR_VA: 20/
OS_SPHERE: PLANO
OS_SPHERE: +0.25
OD_OVR_VA: 20/
OS_CYLINDER: 0.00
OS_VPRISM_DIRECTION: PROGS
OS_AXIS: 005
OD_SPHERE: +0.25
OS_CYLINDER: +0.75
OD_AXIS: 175
OS_OVR_VA: 20/
OD_VPRISM_DIRECTION: PROGS
OD_CYLINDER: +0.75
OS_CYLINDER: +1.25
OD_AXIS: 175
OS_VPRISM_DIRECTION: PROGS
OD_OVR_VA: 20/
OD_ADD: +2.25
OS_ADD: +2.25
OS_AXIS: 180
OD_VPRISM_DIRECTION: PROGS
OD_SPHERE: PLANO

## 2025-08-08 ASSESSMENT — KERATOMETRY
OD_K2POWER_DIOPTERS: 41.00
METHOD_AUTO_MANUAL: AUTO
OD_K1POWER_DIOPTERS: 40.00
OS_K2POWER_DIOPTERS: 40.75
OS_K1POWER_DIOPTERS: 40.00
OD_AXISANGLE_DEGREES: 010
OS_AXISANGLE_DEGREES: 027

## 2025-08-08 ASSESSMENT — REFRACTION_AUTOREFRACTION
OS_AXIS: 101
OD_AXIS: 091
OD_SPHERE: +2.00
OD_CYLINDER: -1.25
OS_CYLINDER: -1.25
OS_SPHERE: +2.00

## 2025-08-08 ASSESSMENT — PACHYMETRY
OD_CT_CORRECTION: -1
OD_CT_UM: 551

## 2025-08-08 ASSESSMENT — TONOMETRY
OS_IOP_MMHG: 17
OD_IOP_MMHG: 17

## 2025-08-08 ASSESSMENT — CONFRONTATIONAL VISUAL FIELD TEST (CVF)
OD_FINDINGS: FULL
OS_FINDINGS: FULL

## 2025-08-08 ASSESSMENT — VISUAL ACUITY
OS_BCVA: 20/25
OD_BCVA: 20/25

## 2025-08-08 ASSESSMENT — SUPERFICIAL PUNCTATE KERATITIS (SPK)
OS_SPK: T
OD_SPK: T